# Patient Record
Sex: MALE | Race: WHITE | NOT HISPANIC OR LATINO | URBAN - NONMETROPOLITAN AREA
[De-identification: names, ages, dates, MRNs, and addresses within clinical notes are randomized per-mention and may not be internally consistent; named-entity substitution may affect disease eponyms.]

---
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
## 2022-04-15 ASSESSMENT — VISUAL ACUITY
OD_SC: 20/20
OS_SC: 20/20
OD_SC: 20/20
OD_SC: 20/20

## 2022-04-15 ASSESSMENT — TONOMETRY
OD_IOP_MMHG: 12
OS_IOP_MMHG: 12
OD_IOP_MMHG: 12
OS_IOP_MMHG: 12

## 2024-05-10 ENCOUNTER — NEW PATIENT (OUTPATIENT)
Dept: URBAN - NONMETROPOLITAN AREA CLINIC 1 | Facility: CLINIC | Age: 24
End: 2024-05-10

## 2024-05-10 DIAGNOSIS — H52.13: ICD-10-CM

## 2024-05-10 PROCEDURE — S0620AEC ROUTINE OPH EXAM INCLUDES REF/ NEW PATIENT

## 2024-05-10 PROCEDURE — 92310-N CONTACT LENS FITTING NEW PATIENT

## 2024-05-10 ASSESSMENT — VISUAL ACUITY
OD_CC: 20/20
OS_CC: 20/20

## 2024-05-10 ASSESSMENT — KERATOMETRY
OS_K2POWER_DIOPTERS: 44.00
OD_K2POWER_DIOPTERS: 43.75
OD_AXISANGLE2_DEGREES: 94
OS_K1POWER_DIOPTERS: 43.50
OD_K1POWER_DIOPTERS: 43.00
OS_AXISANGLE_DEGREES: 165
OD_AXISANGLE_DEGREES: 4
OS_AXISANGLE2_DEGREES: 75

## 2024-05-10 ASSESSMENT — TONOMETRY
OD_IOP_MMHG: 18
OS_IOP_MMHG: 18